# Patient Record
Sex: MALE | Race: WHITE | NOT HISPANIC OR LATINO
[De-identification: names, ages, dates, MRNs, and addresses within clinical notes are randomized per-mention and may not be internally consistent; named-entity substitution may affect disease eponyms.]

---

## 2017-12-07 ENCOUNTER — TRANSCRIPTION ENCOUNTER (OUTPATIENT)
Age: 52
End: 2017-12-07

## 2017-12-08 ENCOUNTER — EMERGENCY (EMERGENCY)
Facility: HOSPITAL | Age: 52
LOS: 0 days | Discharge: HOME | End: 2017-12-08
Attending: EMERGENCY MEDICINE | Admitting: EMERGENCY MEDICINE
Payer: COMMERCIAL

## 2017-12-08 VITALS
SYSTOLIC BLOOD PRESSURE: 151 MMHG | RESPIRATION RATE: 18 BRPM | OXYGEN SATURATION: 99 % | DIASTOLIC BLOOD PRESSURE: 90 MMHG | TEMPERATURE: 98 F | HEART RATE: 92 BPM

## 2017-12-08 VITALS — WEIGHT: 195.99 LBS

## 2017-12-08 DIAGNOSIS — L03.032 CELLULITIS OF LEFT TOE: ICD-10-CM

## 2017-12-08 DIAGNOSIS — M79.89 OTHER SPECIFIED SOFT TISSUE DISORDERS: ICD-10-CM

## 2017-12-08 LAB
ALBUMIN SERPL ELPH-MCNC: 3.8 G/DL — SIGNIFICANT CHANGE UP (ref 3.3–5)
ALP SERPL-CCNC: 68 U/L — SIGNIFICANT CHANGE UP (ref 40–120)
ALT FLD-CCNC: 46 U/L — SIGNIFICANT CHANGE UP (ref 12–78)
ANION GAP SERPL CALC-SCNC: 7 MMOL/L — SIGNIFICANT CHANGE UP (ref 5–17)
AST SERPL-CCNC: 27 U/L — SIGNIFICANT CHANGE UP (ref 15–37)
BASOPHILS # BLD AUTO: 0.1 K/UL — SIGNIFICANT CHANGE UP (ref 0–0.2)
BASOPHILS NFR BLD AUTO: 1 % — SIGNIFICANT CHANGE UP (ref 0–2)
BILIRUB SERPL-MCNC: 0.6 MG/DL — SIGNIFICANT CHANGE UP (ref 0.2–1.2)
BUN SERPL-MCNC: 25 MG/DL — HIGH (ref 7–23)
CALCIUM SERPL-MCNC: 8.7 MG/DL — SIGNIFICANT CHANGE UP (ref 8.5–10.1)
CHLORIDE SERPL-SCNC: 103 MMOL/L — SIGNIFICANT CHANGE UP (ref 96–108)
CO2 SERPL-SCNC: 27 MMOL/L — SIGNIFICANT CHANGE UP (ref 22–31)
CREAT SERPL-MCNC: 1.13 MG/DL — SIGNIFICANT CHANGE UP (ref 0.5–1.3)
EOSINOPHIL # BLD AUTO: 0.1 K/UL — SIGNIFICANT CHANGE UP (ref 0–0.5)
EOSINOPHIL NFR BLD AUTO: 0.9 % — SIGNIFICANT CHANGE UP (ref 0–6)
GLUCOSE SERPL-MCNC: 93 MG/DL — SIGNIFICANT CHANGE UP (ref 70–99)
HCT VFR BLD CALC: 50.6 % — HIGH (ref 39–50)
HGB BLD-MCNC: 16.1 G/DL — SIGNIFICANT CHANGE UP (ref 13–17)
LACTATE SERPL-SCNC: 1 MMOL/L — SIGNIFICANT CHANGE UP (ref 0.7–2)
LYMPHOCYTES # BLD AUTO: 1.2 K/UL — SIGNIFICANT CHANGE UP (ref 1–3.3)
LYMPHOCYTES # BLD AUTO: 16.8 % — SIGNIFICANT CHANGE UP (ref 13–44)
MANUAL DIF COMMENT BLD-IMP: SIGNIFICANT CHANGE UP
MCHC RBC-ENTMCNC: 25.1 PG — LOW (ref 27–34)
MCHC RBC-ENTMCNC: 31.8 GM/DL — LOW (ref 32–36)
MCV RBC AUTO: 78.8 FL — LOW (ref 80–100)
MONOCYTES # BLD AUTO: 0.7 K/UL — SIGNIFICANT CHANGE UP (ref 0–0.9)
MONOCYTES NFR BLD AUTO: 9.4 % — SIGNIFICANT CHANGE UP (ref 2–14)
NEUTROPHILS # BLD AUTO: 5.2 K/UL — SIGNIFICANT CHANGE UP (ref 1.8–7.4)
NEUTROPHILS NFR BLD AUTO: 71.9 % — SIGNIFICANT CHANGE UP (ref 43–77)
PLAT MORPH BLD: NORMAL — SIGNIFICANT CHANGE UP
PLATELET # BLD AUTO: 171 K/UL — SIGNIFICANT CHANGE UP (ref 150–400)
POTASSIUM SERPL-MCNC: 3.7 MMOL/L — SIGNIFICANT CHANGE UP (ref 3.5–5.3)
POTASSIUM SERPL-SCNC: 3.7 MMOL/L — SIGNIFICANT CHANGE UP (ref 3.5–5.3)
PROT SERPL-MCNC: 8.5 GM/DL — HIGH (ref 6–8.3)
RBC # BLD: 6.43 M/UL — HIGH (ref 4.2–5.8)
RBC # FLD: 15.4 % — HIGH (ref 10.3–14.5)
RBC BLD AUTO: NORMAL — SIGNIFICANT CHANGE UP
SODIUM SERPL-SCNC: 137 MMOL/L — SIGNIFICANT CHANGE UP (ref 135–145)
WBC # BLD: 7.2 K/UL — SIGNIFICANT CHANGE UP (ref 3.8–10.5)
WBC # FLD AUTO: 7.2 K/UL — SIGNIFICANT CHANGE UP (ref 3.8–10.5)

## 2017-12-08 PROCEDURE — 73630 X-RAY EXAM OF FOOT: CPT | Mod: 26

## 2017-12-08 PROCEDURE — 99284 EMERGENCY DEPT VISIT MOD MDM: CPT

## 2017-12-08 RX ORDER — VANCOMYCIN HCL 1 G
1000 VIAL (EA) INTRAVENOUS ONCE
Qty: 0 | Refills: 0 | Status: COMPLETED | OUTPATIENT
Start: 2017-12-08 | End: 2017-12-08

## 2017-12-08 RX ORDER — SODIUM CHLORIDE 9 MG/ML
3 INJECTION INTRAMUSCULAR; INTRAVENOUS; SUBCUTANEOUS ONCE
Qty: 0 | Refills: 0 | Status: COMPLETED | OUTPATIENT
Start: 2017-12-08 | End: 2017-12-08

## 2017-12-08 RX ADMIN — SODIUM CHLORIDE 3 MILLILITER(S): 9 INJECTION INTRAMUSCULAR; INTRAVENOUS; SUBCUTANEOUS at 21:17

## 2017-12-08 RX ADMIN — Medication 250 MILLIGRAM(S): at 21:17

## 2017-12-08 NOTE — ED STATDOCS - PROGRESS NOTE DETAILS
signed Jovita Mcdaniels PA-C Pt seen initially in intake by Dr Julian.   Pt here for right 5th toe redness and swelling. Podiatry in ED seeing pt now. signed Jovita Mcdaniels PA-C   pt seen and evaluated by podiatry, discussed case with Dr Castaneda. Pt may DC home on doxy, cellulitis with return precautions, outpt f/u podiatry. No evidence osteo. Pt feeling well, agrees with DC and plan of care.

## 2017-12-08 NOTE — ED STATDOCS - ENMT, MLM
Nasal mucosa clear.  Mouth with normal mucosa  Throat has no vesicles, no oropharyngeal exudates and uvula is midline. Nasal mucosa clear.  Mouth with normal mucosa .

## 2017-12-08 NOTE — ED STATDOCS - ATTENDING CONTRIBUTION TO CARE
I Manfred Julian MD saw and examined the patient. I have supervised the ACP, and agree with plan, assessment and the care provided by the ACP.

## 2017-12-08 NOTE — ED STATDOCS - OBJECTIVE STATEMENT
53 y/o M presents to ED for evaluation of toe swelling. Pt states he commonly wears orthotics for flat feet and sometimes has pain in his toe due to callouses. He noticed yesterday his toe way swollen and sought evaluation at an urgent care where they believe the toe was inflamed due to the callouses. He was given mobic and was d/c'd. He noted his right foot now is swollen which prompted him to seek ED evaluation. 5th toe is noted to have the Sx. No fever, chills. 51 y/o M presents to ED for evaluation of toe swelling. Pt states he commonly wears orthotics for flat feet and sometimes has pain in his toe due to callouses. He noticed yesterday his right fifth toe was swollen and sought evaluation at an urgent care where they believe the toe was inflamed. He was given Mobic and was d/c'd. He noted his right foot now is more swollen which prompted him to seek ED evaluation. 5th toe is noted by patient to have the Sx. No fever, chills. No cp, sob, or palpitation. No abdominal pain. No rash. No nuchal rigidity.

## 2017-12-08 NOTE — ED STATDOCS - CONSTITUTIONAL, MLM
well appearing, well nourished, and in no apparent distress. normal... well appearing, well nourished, and in no apparent distress. Nontoxic appearing.

## 2017-12-08 NOTE — ED ADULT TRIAGE NOTE - CHIEF COMPLAINT QUOTE
pt c/o toe swelling on tuesday seen at urgent care and given an antiinflammatory, pt states today his whole foot is swollen and warm to touch. pt denies fevers

## 2017-12-08 NOTE — ED STATDOCS - NS_ ATTENDINGSCRIBEDETAILS _ED_A_ED_FT
I Manfred Julian MD saw and examined the patient. Scribe documented for me and under my supervision. I have modified the scribe's documentation where necessary to reflect my history, physical exam findings, and other relevant documentations pretraining to the care of the patient.

## 2017-12-08 NOTE — ED STATDOCS - SKIN, MLM
swelling of the right foot and right 5th toe with erythema extending up the right foot. swelling of the right fifth digit at the right 5th Metatarsal-phalyngeal joint/ fifth toe with erythema extending up the to the dorsum of right foot. Cap refill less than 3 seconds. No limitation in range of motion of the fifth toe.

## 2017-12-08 NOTE — ED STATDOCS - MEDICAL DECISION MAKING DETAILS
Plan for labs, ABx, will consult podiatry, reassess. Plan for labs, ABx, will consult podiatry, reassess.  Iesha SPEARS: Podiatry saw patient and examined patient. Patient is cleared by podiatry to go home, take doxycyline and to follow up with Dr. Castaneda as soon as possible. Provided patient with script and outpatient follow up.

## 2017-12-14 LAB
CULTURE RESULTS: SIGNIFICANT CHANGE UP
CULTURE RESULTS: SIGNIFICANT CHANGE UP
SPECIMEN SOURCE: SIGNIFICANT CHANGE UP
SPECIMEN SOURCE: SIGNIFICANT CHANGE UP

## 2019-08-14 NOTE — ED STATDOCS - CONSTITUTIONAL NEGATIVE STATEMENT, MLM
INR 2.7 per venous draw at [facility].    Old dose: Warfarin 1.25mg on Tu, Fri & Sat with 2.5mg ROW.    New dose: continue same dose.    Recheck in 3 weeks 9/4.  Called and discussed INR result, no dose change and when to recheck with patient's wife.  She stated understanding.  
no fever and no chills.

## 2019-08-29 ENCOUNTER — TRANSCRIPTION ENCOUNTER (OUTPATIENT)
Age: 54
End: 2019-08-29

## 2020-01-02 ENCOUNTER — TRANSCRIPTION ENCOUNTER (OUTPATIENT)
Age: 55
End: 2020-01-02

## 2021-07-17 ENCOUNTER — TRANSCRIPTION ENCOUNTER (OUTPATIENT)
Age: 56
End: 2021-07-17

## 2024-03-07 ENCOUNTER — APPOINTMENT (OUTPATIENT)
Dept: ORTHOPEDIC SURGERY | Facility: CLINIC | Age: 59
End: 2024-03-07
Payer: COMMERCIAL

## 2024-03-07 VITALS
HEIGHT: 67 IN | DIASTOLIC BLOOD PRESSURE: 81 MMHG | TEMPERATURE: 98.1 F | BODY MASS INDEX: 32.18 KG/M2 | WEIGHT: 205 LBS | SYSTOLIC BLOOD PRESSURE: 137 MMHG | HEART RATE: 80 BPM

## 2024-03-07 DIAGNOSIS — Z83.49 FAMILY HISTORY OF OTHER ENDOCRINE, NUTRITIONAL AND METABOLIC DISEASES: ICD-10-CM

## 2024-03-07 DIAGNOSIS — M25.512 PAIN IN LEFT SHOULDER: ICD-10-CM

## 2024-03-07 DIAGNOSIS — Z87.09 PERSONAL HISTORY OF OTHER DISEASES OF THE RESPIRATORY SYSTEM: ICD-10-CM

## 2024-03-07 DIAGNOSIS — Z83.3 FAMILY HISTORY OF DIABETES MELLITUS: ICD-10-CM

## 2024-03-07 DIAGNOSIS — Z81.8 FAMILY HISTORY OF OTHER MENTAL AND BEHAVIORAL DISORDERS: ICD-10-CM

## 2024-03-07 DIAGNOSIS — Z78.9 OTHER SPECIFIED HEALTH STATUS: ICD-10-CM

## 2024-03-07 PROCEDURE — 99203 OFFICE O/P NEW LOW 30 MIN: CPT

## 2024-03-07 PROCEDURE — 73030 X-RAY EXAM OF SHOULDER: CPT | Mod: LT

## 2024-03-07 RX ORDER — MELOXICAM 15 MG/1
15 TABLET ORAL
Qty: 21 | Refills: 0 | Status: ACTIVE | COMMUNITY
Start: 2024-03-07 | End: 1900-01-01

## 2024-03-07 NOTE — PHYSICAL EXAM
[de-identified] : General: Awake, alert, no acute distress, Patient was cooperative and appropriate during the examination.  The patient is of normal weight for height and age.  Walks without an antalgic gait.   Left shoulder Exam: Physical exam of the shoulder demonstrates normal skin without signs of skin changes or abnormalities. No erythema, warmth, or joint effusion appreciated.  Sensation intact light touch C5-T1 Palpable radial pulse Radial/ulnar/median/axillary/musculocutaneous/AIN/PIN nerves grossly intact  Range of motion: Forward Flexion: 170 Abduction: 100 External Rotation: 45 Internal Rotation: L3  Palpation: Not tender to palpation over the glenohumeral joint Moderately tender palpation over the rotator cuff insertion on the greater tuberosity Not tender to palpation over the AC joint Mildly tender to palpation of the biceps tendon/bicipital groove  Strength testing: Supraspinatus: 4+/5 Infraspinatus: 4/5 Subscapularis: 5/5  Special test: Empty can test positive Bains impingement test positive Speeds test negative Burleson's test negative Lift-off test negative Bell-press test negative Cross-arm adduction test negative   [de-identified] : X-rays including 4 views of the left shoulder were obtained in the office on 3/7/2024 and reviewed the patient.  There is no acute fracture or dislocation.  There is mild calcific tendinopathy about the posterior rotator cuff.  There are moderate degenerative changes of the AC joint.

## 2024-03-07 NOTE — HISTORY OF PRESENT ILLNESS
[de-identified] : 3/7/2024: Cameron is a pleasant 58-year-old right-hand-dominant male presents the office today for evaluation of left shoulder pain.  Patient states that he injured himself weightlifting about 1 and half months ago.  He is on a chest press machine and lost control and hyperextended his left shoulder.  Since that time he has had persistent pain in the front of the shoulder.  He has been taking over-the-counter anti-inflammatory medication such as Motrin for the pain which are mildly helpful.  He has been working with Aj Meeks from physical therapy which has been helping.  He is also attempted some acupuncture and massage therapy on his own.  Generally his symptoms have improved but his pain is persistent and he does feel little bit weak in the shoulder.  He does have a rotator cuff tear in the right shoulder which she has never had treated.  The patient denies any fevers, chills, sweats, recent illnesses, numbness, tingling, or pain elsewhere at this time.

## 2024-03-07 NOTE — DISCUSSION/SUMMARY
[de-identified] : Assessment: 58-year-old male with left shoulder pain secondary to possible rotator cuff injury  Plan: I had a long discussion with the patient today regarding the nature of their diagnosis and treatment plan. We discussed the risks and benefits of no treatment as well as nonoperative and operative treatments.  I reviewed the patient's x-rays today with him in the office which are negative for any acute pathology.  On examination he does have some weakness to resisted forward elevation and external rotation.  He has attempted conservative care in the form of anti-inflammatory medications, massage, therapy, physical therapy, and acupuncture but continues to have pain and persistent weakness.  At this time I recommend an MRI to rule out a traumatic rotator cuff tear.  Continue to manage himself symptomatically on his own at home.  Recommend follow-up after the MRI to discuss results in person than any further recommendations.  Should the patient have an acute traumatic full-thickness rotator cuff tear surgical intervention may be indicated.  The patient verbalizes their understanding and agrees with the plan.  All questions were answered to their satisfaction.

## 2024-08-18 ENCOUNTER — NON-APPOINTMENT (OUTPATIENT)
Age: 59
End: 2024-08-18